# Patient Record
Sex: FEMALE | Race: WHITE | NOT HISPANIC OR LATINO
[De-identification: names, ages, dates, MRNs, and addresses within clinical notes are randomized per-mention and may not be internally consistent; named-entity substitution may affect disease eponyms.]

---

## 2022-08-15 ENCOUNTER — TELEPHONE (OUTPATIENT)
Dept: SCHEDULING | Age: 27
End: 2022-08-15

## 2022-08-15 ENCOUNTER — APPOINTMENT (OUTPATIENT)
Dept: URGENT CARE | Age: 27
End: 2022-08-15

## 2022-08-15 ENCOUNTER — OFFICE VISIT (OUTPATIENT)
Dept: URGENT CARE | Age: 27
End: 2022-08-15

## 2022-08-15 DIAGNOSIS — Z30.42 DEPO-PROVERA CONTRACEPTIVE STATUS: Primary | ICD-10-CM

## 2022-08-15 PROCEDURE — 96372 THER/PROPH/DIAG INJ SC/IM: CPT | Performed by: NURSE PRACTITIONER

## 2022-08-15 RX ORDER — MEDROXYPROGESTERONE ACETATE 150 MG/ML
150 INJECTION, SUSPENSION INTRAMUSCULAR ONCE
Status: COMPLETED | OUTPATIENT
Start: 2022-08-15 | End: 2022-08-15

## 2022-08-15 RX ADMIN — MEDROXYPROGESTERONE ACETATE 150 MG: 150 INJECTION, SUSPENSION INTRAMUSCULAR at 12:45

## 2022-11-07 ENCOUNTER — NURSE ONLY (OUTPATIENT)
Dept: URGENT CARE | Age: 27
End: 2022-11-07

## 2022-11-07 VITALS
HEART RATE: 94 BPM | SYSTOLIC BLOOD PRESSURE: 116 MMHG | RESPIRATION RATE: 18 BRPM | HEIGHT: 64 IN | WEIGHT: 164 LBS | TEMPERATURE: 98.1 F | OXYGEN SATURATION: 98 % | BODY MASS INDEX: 28 KG/M2 | DIASTOLIC BLOOD PRESSURE: 78 MMHG

## 2022-11-07 DIAGNOSIS — Z30.42 DEPO-PROVERA CONTRACEPTIVE STATUS: Primary | ICD-10-CM

## 2022-11-07 PROCEDURE — 96372 THER/PROPH/DIAG INJ SC/IM: CPT | Performed by: NURSE PRACTITIONER

## 2022-11-07 RX ORDER — MEDROXYPROGESTERONE ACETATE 150 MG/ML
150 INJECTION, SUSPENSION INTRAMUSCULAR ONCE
Status: COMPLETED | OUTPATIENT
Start: 2022-11-07 | End: 2022-11-07

## 2022-11-07 RX ADMIN — MEDROXYPROGESTERONE ACETATE 150 MG: 150 INJECTION, SUSPENSION INTRAMUSCULAR at 09:45

## 2022-11-15 ENCOUNTER — HOSPITAL ENCOUNTER (EMERGENCY)
Facility: HOSPITAL | Age: 27
Discharge: HOME | End: 2022-11-16
Attending: EMERGENCY MEDICINE
Payer: MEDICAID

## 2022-11-15 VITALS
BODY MASS INDEX: 27.66 KG/M2 | RESPIRATION RATE: 18 BRPM | DIASTOLIC BLOOD PRESSURE: 78 MMHG | HEIGHT: 64 IN | SYSTOLIC BLOOD PRESSURE: 123 MMHG | WEIGHT: 162 LBS | HEART RATE: 98 BPM | OXYGEN SATURATION: 96 % | TEMPERATURE: 98 F

## 2022-11-15 DIAGNOSIS — G57.11 MERALGIA PARAESTHETICA, RIGHT: Primary | ICD-10-CM

## 2022-11-15 PROCEDURE — 99283 EMERGENCY DEPT VISIT LOW MDM: CPT

## 2022-11-15 NOTE — ED INITIAL ASSESSMENT (HPI)
Pain. Tingling , burning  In right thigh since 2 months increased since few days , no swelling . Seen her primary 1 week ago  Took medications but  No improvement.

## 2022-11-16 ENCOUNTER — APPOINTMENT (OUTPATIENT)
Dept: GENERAL RADIOLOGY | Facility: HOSPITAL | Age: 27
End: 2022-11-16
Attending: EMERGENCY MEDICINE
Payer: MEDICAID

## 2022-11-16 LAB — B-HCG UR QL: NEGATIVE

## 2022-11-16 PROCEDURE — 72110 X-RAY EXAM L-2 SPINE 4/>VWS: CPT | Performed by: EMERGENCY MEDICINE

## 2022-11-16 PROCEDURE — 81025 URINE PREGNANCY TEST: CPT

## 2022-11-30 ENCOUNTER — HOSPITAL ENCOUNTER (EMERGENCY)
Facility: HOSPITAL | Age: 27
Discharge: HOME | End: 2022-11-30
Attending: EMERGENCY MEDICINE
Payer: MEDICAID

## 2022-11-30 VITALS
HEIGHT: 64 IN | TEMPERATURE: 100 F | BODY MASS INDEX: 27.31 KG/M2 | WEIGHT: 160 LBS | RESPIRATION RATE: 16 BRPM | DIASTOLIC BLOOD PRESSURE: 82 MMHG | HEART RATE: 105 BPM | OXYGEN SATURATION: 99 % | SYSTOLIC BLOOD PRESSURE: 122 MMHG

## 2022-11-30 DIAGNOSIS — J10.1 INFLUENZA A: Primary | ICD-10-CM

## 2022-11-30 DIAGNOSIS — U07.1 COVID-19: ICD-10-CM

## 2022-11-30 LAB
BILIRUB UR QL STRIP.AUTO: NEGATIVE
COLOR UR AUTO: YELLOW
FLUAV + FLUBV RNA SPEC NAA+PROBE: NEGATIVE
FLUAV + FLUBV RNA SPEC NAA+PROBE: POSITIVE
GLUCOSE UR STRIP.AUTO-MCNC: NEGATIVE MG/DL
KETONES UR STRIP.AUTO-MCNC: NEGATIVE MG/DL
LEUKOCYTE ESTERASE UR QL STRIP.AUTO: NEGATIVE
NITRITE UR QL STRIP.AUTO: NEGATIVE
PH UR STRIP.AUTO: 6.5 [PH] (ref 5–8)
RSV RNA SPEC NAA+PROBE: NEGATIVE
SARS-COV-2 RNA RESP QL NAA+PROBE: DETECTED
SP GR UR STRIP.AUTO: 1.02 (ref 1–1.03)
UROBILINOGEN UR STRIP.AUTO-MCNC: 0.2 MG/DL

## 2022-11-30 PROCEDURE — 0241U SARS-COV-2/FLU A AND B/RSV BY PCR (GENEXPERT): CPT | Performed by: EMERGENCY MEDICINE

## 2022-11-30 PROCEDURE — 81001 URINALYSIS AUTO W/SCOPE: CPT | Performed by: EMERGENCY MEDICINE

## 2022-11-30 PROCEDURE — 81015 MICROSCOPIC EXAM OF URINE: CPT | Performed by: EMERGENCY MEDICINE

## 2022-11-30 PROCEDURE — 99283 EMERGENCY DEPT VISIT LOW MDM: CPT | Performed by: EMERGENCY MEDICINE

## 2022-11-30 RX ORDER — ACETAMINOPHEN 500 MG
1000 TABLET ORAL ONCE
Status: COMPLETED | OUTPATIENT
Start: 2022-11-30 | End: 2022-11-30

## 2022-11-30 RX ORDER — DEXMETHYLPHENIDATE HYDROCHLORIDE 10 MG/1
10 TABLET ORAL DAILY
COMMUNITY

## 2022-11-30 RX ORDER — FLUOXETINE HYDROCHLORIDE 60 MG/1
TABLET, FILM COATED ORAL; ORAL DAILY
COMMUNITY

## 2022-11-30 RX ORDER — RISPERIDONE 1 MG/1
1 TABLET ORAL 2 TIMES DAILY
COMMUNITY

## 2023-02-07 ENCOUNTER — APPOINTMENT (OUTPATIENT)
Dept: INTERNAL MEDICINE | Age: 28
End: 2023-02-07

## 2023-02-07 ENCOUNTER — NURSE ONLY (OUTPATIENT)
Dept: URGENT CARE | Age: 28
End: 2023-02-07

## 2023-02-07 VITALS — TEMPERATURE: 98 F

## 2023-02-07 DIAGNOSIS — Z30.42 DEPO-PROVERA CONTRACEPTIVE STATUS: Primary | ICD-10-CM

## 2023-02-07 PROCEDURE — 96372 THER/PROPH/DIAG INJ SC/IM: CPT | Performed by: REGISTERED NURSE

## 2023-02-07 RX ORDER — MEDROXYPROGESTERONE ACETATE 150 MG/ML
150 INJECTION, SUSPENSION INTRAMUSCULAR ONCE
Status: DISCONTINUED | OUTPATIENT
Start: 2023-02-07 | End: 2023-02-07

## 2023-05-01 ENCOUNTER — APPOINTMENT (OUTPATIENT)
Dept: URGENT CARE | Age: 28
End: 2023-05-01

## 2023-05-02 ENCOUNTER — OFFICE VISIT (OUTPATIENT)
Dept: URGENT CARE | Age: 28
End: 2023-05-02

## 2023-05-02 DIAGNOSIS — Z30.42 DEPO-PROVERA CONTRACEPTIVE STATUS: Primary | ICD-10-CM

## 2023-05-02 PROCEDURE — 96372 THER/PROPH/DIAG INJ SC/IM: CPT | Performed by: NURSE PRACTITIONER

## 2023-05-02 RX ORDER — MEDROXYPROGESTERONE ACETATE 150 MG/ML
150 INJECTION, SUSPENSION INTRAMUSCULAR ONCE
Status: COMPLETED | OUTPATIENT
Start: 2023-05-02 | End: 2023-05-02

## 2023-05-02 RX ADMIN — MEDROXYPROGESTERONE ACETATE 150 MG: 150 INJECTION, SUSPENSION INTRAMUSCULAR at 11:15

## 2023-07-28 ENCOUNTER — OFFICE VISIT (OUTPATIENT)
Dept: URGENT CARE | Age: 28
End: 2023-07-28

## 2023-07-28 DIAGNOSIS — Z30.42 ENCOUNTER FOR DEPO-PROVERA CONTRACEPTION: Primary | ICD-10-CM

## 2023-07-28 PROCEDURE — 96372 THER/PROPH/DIAG INJ SC/IM: CPT | Performed by: NURSE PRACTITIONER

## 2023-07-28 RX ORDER — MEDROXYPROGESTERONE ACETATE 150 MG/ML
150 INJECTION, SUSPENSION INTRAMUSCULAR ONCE
Status: COMPLETED | OUTPATIENT
Start: 2023-07-28 | End: 2023-07-28

## 2023-07-28 RX ADMIN — MEDROXYPROGESTERONE ACETATE 150 MG: 150 INJECTION, SUSPENSION INTRAMUSCULAR at 15:39

## 2023-08-20 ENCOUNTER — HOSPITAL ENCOUNTER (EMERGENCY)
Facility: HOSPITAL | Age: 28
Discharge: HOME | End: 2023-08-20
Attending: EMERGENCY MEDICINE
Payer: MEDICAID

## 2023-08-20 ENCOUNTER — APPOINTMENT (OUTPATIENT)
Dept: GENERAL RADIOLOGY | Facility: HOSPITAL | Age: 28
End: 2023-08-20
Attending: EMERGENCY MEDICINE
Payer: MEDICAID

## 2023-08-20 ENCOUNTER — APPOINTMENT (OUTPATIENT)
Dept: GENERAL RADIOLOGY | Facility: HOSPITAL | Age: 28
End: 2023-08-20
Payer: MEDICAID

## 2023-08-20 VITALS
DIASTOLIC BLOOD PRESSURE: 89 MMHG | WEIGHT: 140 LBS | TEMPERATURE: 98 F | HEART RATE: 102 BPM | HEIGHT: 64.57 IN | SYSTOLIC BLOOD PRESSURE: 127 MMHG | OXYGEN SATURATION: 98 % | RESPIRATION RATE: 18 BRPM | BODY MASS INDEX: 23.61 KG/M2

## 2023-08-20 DIAGNOSIS — S93.401A MODERATE RIGHT ANKLE SPRAIN, INITIAL ENCOUNTER: Primary | ICD-10-CM

## 2023-08-20 PROCEDURE — 99284 EMERGENCY DEPT VISIT MOD MDM: CPT

## 2023-08-20 PROCEDURE — 73630 X-RAY EXAM OF FOOT: CPT | Performed by: EMERGENCY MEDICINE

## 2023-08-20 PROCEDURE — 73610 X-RAY EXAM OF ANKLE: CPT

## 2023-08-20 PROCEDURE — 99283 EMERGENCY DEPT VISIT LOW MDM: CPT

## 2023-08-20 NOTE — ED INITIAL ASSESSMENT (HPI)
Pt states that she was at [employer] yesterday and was running down a hill and tripped and fell. Pt c/o of pain to her right ankle. Pt denies head injury or LOC. Pt took 800 mg ibuprofen PTA.

## 2023-09-20 ENCOUNTER — HOSPITAL ENCOUNTER (EMERGENCY)
Facility: HOSPITAL | Age: 28
Discharge: HOME | End: 2023-09-20
Attending: EMERGENCY MEDICINE
Payer: OTHER MISCELLANEOUS

## 2023-09-20 VITALS
DIASTOLIC BLOOD PRESSURE: 96 MMHG | OXYGEN SATURATION: 100 % | RESPIRATION RATE: 18 BRPM | TEMPERATURE: 99 F | HEART RATE: 96 BPM | SYSTOLIC BLOOD PRESSURE: 138 MMHG

## 2023-09-20 DIAGNOSIS — S39.012A STRAIN OF LUMBAR REGION, INITIAL ENCOUNTER: Primary | ICD-10-CM

## 2023-09-20 PROCEDURE — 99283 EMERGENCY DEPT VISIT LOW MDM: CPT

## 2023-09-20 PROCEDURE — 99284 EMERGENCY DEPT VISIT MOD MDM: CPT

## 2023-09-20 RX ORDER — CYCLOBENZAPRINE HCL 10 MG
10 TABLET ORAL 3 TIMES DAILY PRN
Qty: 20 TABLET | Refills: 0 | Status: SHIPPED | OUTPATIENT
Start: 2023-09-20 | End: 2023-09-27

## 2023-09-20 NOTE — DISCHARGE INSTRUCTIONS
Use naproxen Aleve ibuprofen or Tylenol for milder pain. Apply ice 20 minutes 4 times a day. You can use over-the-counter Salonpas patches. For more severe pain you can take Flexeril this may make you drowsy do not drink or drive while taking it. Bedrest with gradual increase in activity over the next couple of days. Follow-up with occupational medicine in 2 days.

## 2023-12-27 PROCEDURE — 99284 EMERGENCY DEPT VISIT MOD MDM: CPT

## 2023-12-28 ENCOUNTER — HOSPITAL ENCOUNTER (EMERGENCY)
Facility: HOSPITAL | Age: 28
Discharge: HOME | End: 2023-12-28
Attending: EMERGENCY MEDICINE
Payer: MEDICAID

## 2023-12-28 VITALS
SYSTOLIC BLOOD PRESSURE: 119 MMHG | HEIGHT: 64 IN | OXYGEN SATURATION: 96 % | HEART RATE: 95 BPM | BODY MASS INDEX: 25.95 KG/M2 | TEMPERATURE: 99 F | WEIGHT: 152 LBS | RESPIRATION RATE: 18 BRPM | DIASTOLIC BLOOD PRESSURE: 75 MMHG

## 2023-12-28 DIAGNOSIS — J40 BRONCHITIS: Primary | ICD-10-CM

## 2023-12-28 LAB
FLUAV + FLUBV RNA SPEC NAA+PROBE: NEGATIVE
FLUAV + FLUBV RNA SPEC NAA+PROBE: NEGATIVE
RSV RNA SPEC NAA+PROBE: NEGATIVE
SARS-COV-2 RNA RESP QL NAA+PROBE: NOT DETECTED

## 2023-12-28 PROCEDURE — 0241U SARS-COV-2/FLU A AND B/RSV BY PCR (GENEXPERT): CPT | Performed by: EMERGENCY MEDICINE

## 2023-12-28 RX ORDER — PREDNISONE 20 MG/1
20 TABLET ORAL DAILY
Qty: 5 TABLET | Refills: 0 | Status: SHIPPED | OUTPATIENT
Start: 2023-12-28 | End: 2024-01-02

## 2023-12-28 RX ORDER — CETIRIZINE HYDROCHLORIDE 10 MG/1
10 TABLET ORAL DAILY
COMMUNITY

## 2024-11-06 ENCOUNTER — HOSPITAL ENCOUNTER (OUTPATIENT)
Age: 29
Discharge: HOME | End: 2024-11-06
Payer: OTHER MISCELLANEOUS

## 2024-11-06 ENCOUNTER — APPOINTMENT (OUTPATIENT)
Dept: GENERAL RADIOLOGY | Age: 29
End: 2024-11-06
Attending: PHYSICIAN ASSISTANT
Payer: OTHER MISCELLANEOUS

## 2024-11-06 VITALS
DIASTOLIC BLOOD PRESSURE: 52 MMHG | SYSTOLIC BLOOD PRESSURE: 109 MMHG | HEART RATE: 77 BPM | OXYGEN SATURATION: 99 % | TEMPERATURE: 100 F | RESPIRATION RATE: 22 BRPM | WEIGHT: 160 LBS | HEIGHT: 64 IN | BODY MASS INDEX: 27.31 KG/M2

## 2024-11-06 DIAGNOSIS — S49.91XA INJURY OF RIGHT SHOULDER, INITIAL ENCOUNTER: Primary | ICD-10-CM

## 2024-11-06 PROCEDURE — 73030 X-RAY EXAM OF SHOULDER: CPT | Performed by: PHYSICIAN ASSISTANT

## 2024-11-06 PROCEDURE — 99203 OFFICE O/P NEW LOW 30 MIN: CPT | Performed by: PHYSICIAN ASSISTANT

## 2024-11-06 NOTE — DISCHARGE INSTRUCTIONS
Continue follow-up with Occupational medicine.    You may take over-the-counter ibuprofen and/or Tylenol as needed for pain.

## 2024-11-06 NOTE — ED INITIAL ASSESSMENT (HPI)
Hx of torn right rotator cuff.  Sudden onset of pain while lifting a 50 lb box of lemonade off high shelf.

## 2024-11-07 NOTE — ED PROVIDER NOTES
Patient Seen in: [facility]      History     Chief Complaint   Patient presents with    Arm or Hand Injury     Stated Complaint: Rt shoulder injury    Subjective:   HPI    28 YO female, reported hx of torn right rotator cuff, presents to immediate care for evaluation of right shoulder pain starting today at work after lifting a 50 pound jug of liquid. She denies any other pain sites at this time.           Objective:     Past Medical History:    ADHD    Anxiety    Asperger syndrome (HCC)              History reviewed. No pertinent surgical history.             Social History     Socioeconomic History    Marital status:    Tobacco Use    Smoking status: Never    Smokeless tobacco: Never   Vaping Use    Vaping status: Never Used   Substance and Sexual Activity    Alcohol use: Never    Drug use: Never     Social Drivers of Health     Financial Resource Strain: Not on File (10/7/2022)    Received from [facility]    Financial Resource Strain     Financial Resource Strain: 0   Food Insecurity: Not on File (2024)    Received from [employer]    Food Insecurity     Food: 0   Transportation Needs: Not on File (10/7/2022)    Received from [facility]    Transportation Needs     Transportation: 0   Physical Activity: Not on File (10/7/2022)    Received from [facility]    Physical Activity     Physical Activity: 0   Stress: Not on File (10/7/2022)    Received from [facility]    Stress     Stress: 0   Social Connections: Not on File (2024)    Received from [employer]    Social Connections     Connectedness: 0   Housing Stability: Not on File (10/7/2022)    Received from [facility]    Housing Stability     Housin              Review of Systems    Positive for stated complaint: Rt shoulder injury  Other systems are as noted in HPI.  Constitutional and vital signs reviewed.      All other systems reviewed and negative except as noted above.    Physical Exam     ED Triage Vitals [24  1654]   /52   Pulse 77   Resp 22   Temp 99.5 °F (37.5 °C)   Temp src Temporal   SpO2 99 %   O2 Device None (Room air)       Current Vitals:   Vital Signs  BP: 109/52  Pulse: 77  Resp: 22  Temp: 99.5 °F (37.5 °C)  Temp src: Temporal    Oxygen Therapy  SpO2: 99 %  O2 Device: None (Room air)        Physical Exam  Vitals and nursing note reviewed.   Constitutional:       General: She is not in acute distress.     Appearance: Normal appearance. She is not ill-appearing, toxic-appearing or diaphoretic.   Cardiovascular:      Rate and Rhythm: Normal rate.   Pulmonary:      Effort: Pulmonary effort is normal. No respiratory distress.   Musculoskeletal:      Right shoulder: Tenderness (Generalized tenderness with palpation to the shoulder) present. No swelling. Decreased range of motion (Decreased abduction secondary to generalized tenderness).   Neurological:      Mental Status: She is alert and oriented to person, place, and time.   Psychiatric:         Behavior: Behavior normal.         ED Course   Labs Reviewed - No data to display  XR SHOULDER, COMPLETE (MIN 2 VIEWS), RIGHT (CPT=73030)    Result Date: 11/6/2024  PROCEDURE:  XR SHOULDER, COMPLETE (MIN 2 VIEWS), RIGHT (CPT=73030)  TECHNIQUE:  Multiple views were obtained.  COMPARISON:  None.  INDICATIONS:  Rt shoulder injury  PATIENT STATED HISTORY: (As transcribed by Technologist)  Patient in 2018 tore her right rotator cuff from using the  for too long. Patient today lifted 50 pounds over her head and now has pain in her right shoulder. Patient has limited range of motion.    FINDINGS:  No fracture, dislocation or osseous abnormality.            CONCLUSION:  No abnormality demonstrated in the right shoulder.     LOCATION:  [facility]   Dictated by (CST): Mike Munson MD on 11/06/2024 at 5:47 PM     Finalized by (CST): Mike Munson MD on 11/06/2024 at 5:47 PM           MDM      Differential diagnosis considered but not limited to sprain/strain, rotator  cuff tear, less likely acute osseous process    Physical exam as above. Right shoulder x-ray is negative for fracture or dislocation.  Patient to continue follow-up with occupational medicine this week. Ibuprofen and/or Tylenol as needed for pain.       Medical Decision Making  Amount and/or Complexity of Data Reviewed  Radiology: ordered and independent interpretation performed. Decision-making details documented in ED Course.    Risk  OTC drugs.        Disposition and Plan     Clinical Impression:  1. Injury of right shoulder, initial encounter         Disposition:  Discharge  11/6/2024  5:56 pm    Follow-up:  Chandan Ramírez MD  [address] [place] [address]  [phone]    Schedule an appointment as soon as possible for a visit             Medications Prescribed:  Discharge Medication List as of 11/6/2024  5:57 PM              Supplementary Documentation:

## 2024-12-09 ENCOUNTER — APPOINTMENT (OUTPATIENT)
Dept: GENERAL RADIOLOGY | Age: 29
End: 2024-12-09
Attending: PHYSICIAN ASSISTANT
Payer: COMMERCIAL

## 2024-12-09 ENCOUNTER — HOSPITAL ENCOUNTER (OUTPATIENT)
Age: 29
Discharge: HOME | End: 2024-12-09
Payer: COMMERCIAL

## 2024-12-09 VITALS
HEART RATE: 98 BPM | HEIGHT: 65 IN | DIASTOLIC BLOOD PRESSURE: 86 MMHG | RESPIRATION RATE: 18 BRPM | BODY MASS INDEX: 26.66 KG/M2 | WEIGHT: 160 LBS | TEMPERATURE: 99 F | OXYGEN SATURATION: 98 % | SYSTOLIC BLOOD PRESSURE: 132 MMHG

## 2024-12-09 DIAGNOSIS — R05.1 ACUTE COUGH: ICD-10-CM

## 2024-12-09 DIAGNOSIS — J22 LOWER RESPIRATORY TRACT INFECTION: Primary | ICD-10-CM

## 2024-12-09 LAB — SARS-COV-2 RNA RESP QL NAA+PROBE: NOT DETECTED

## 2024-12-09 PROCEDURE — 71046 X-RAY EXAM CHEST 2 VIEWS: CPT | Performed by: PHYSICIAN ASSISTANT

## 2024-12-09 PROCEDURE — 99204 OFFICE O/P NEW MOD 45 MIN: CPT | Performed by: PHYSICIAN ASSISTANT

## 2024-12-09 PROCEDURE — U0002 COVID-19 LAB TEST NON-CDC: HCPCS | Performed by: PHYSICIAN ASSISTANT

## 2024-12-09 RX ORDER — AZITHROMYCIN 250 MG/1
TABLET, FILM COATED ORAL
Qty: 6 TABLET | Refills: 0 | Status: SHIPPED | OUTPATIENT
Start: 2024-12-09 | End: 2024-12-14

## 2024-12-09 RX ORDER — BENZONATATE 100 MG/1
100 CAPSULE ORAL 3 TIMES DAILY PRN
Qty: 30 CAPSULE | Refills: 0 | Status: SHIPPED | OUTPATIENT
Start: 2024-12-09 | End: 2025-01-08

## 2024-12-10 NOTE — DISCHARGE INSTRUCTIONS
Please follow up with your PCP if no improvement within 5-7 days. Go directly to the ER for any acute worsening of symptoms.    If you smoke, stop smoking.  Push oral fluids to help loosen up chest mucous and move it out of your body.  Use a cold mist humidifier.  Get enough rest and sleep.  Take Guaifenesin (Robitussin), an expectorant to loosen mucous.  To suppress a dry, hacking cough, you may take Dextromethorphan (Robitussin DM)    If antibiotics is prescribed, be sure to take all of the the medication even if you improve before finishing the medication.  Seek immediate medical attention if symptoms worsen, if fever higher than 102, if no improvement in a few days.  Symptoms usually last 10 days.

## 2024-12-10 NOTE — ED INITIAL ASSESSMENT (HPI)
Pt began 5-6 days ago with laryngitis, and a sore throat.  Her voice came back and then started with a bad cough.  She now has a lot of sinus and chest congestion

## 2024-12-10 NOTE — ED PROVIDER NOTES
Patient Seen in: [facility]      History     Chief Complaint   Patient presents with    Cough/URI     Stated Complaint: Cough; headache    Subjective:   HPI      29-year-old female with known history of Asperger syndrome anxiety and ADHD who comes in today complaining of persistent and worsening cough and congestion.  Patient states that the cough is becoming more harsh and worsening.  Patient denies any other symptoms at this time.  Patient denies fever and chills.    Objective:     Past Medical History:    ADHD    Anxiety    Asperger syndrome (HCC)              History reviewed. No pertinent surgical history.             Social History     Socioeconomic History    Marital status:    Tobacco Use    Smoking status: Never    Smokeless tobacco: Never   Vaping Use    Vaping status: Some Days   Substance and Sexual Activity    Alcohol use: Never    Drug use: Never     Social Drivers of Health     Financial Resource Strain: Not on File (10/7/2022)    Received from [facility]    Financial Resource Strain     Financial Resource Strain: 0   Food Insecurity: Not on File (2024)    Received from [employer]    Food Insecurity     Food: 0   Transportation Needs: Not on File (10/7/2022)    Received from [facility]    Transportation Needs     Transportation: 0   Physical Activity: Not on File (10/7/2022)    Received from [facility]    Physical Activity     Physical Activity: 0   Stress: Not on File (10/7/2022)    Received from [facility]    Stress     Stress: 0   Social Connections: Not on File (2024)    Received from [facility] [employer] [facility]    Social Connections     Connectedness: 0   Housing Stability: Not on File (10/7/2022)    Received from [facility]    Housing Stability     Housin              Review of Systems    Positive for stated complaint: Cough; headache  Other systems are as noted in HPI.  Constitutional and vital signs reviewed.      All other systems reviewed and negative except as  noted above.    Physical Exam     ED Triage Vitals [12/09/24 1859]   /86   Pulse 98   Resp 18   Temp 98.5 °F (36.9 °C)   Temp src Oral   SpO2 98 %   O2 Device None (Room air)       Current Vitals:   Vital Signs  BP: 132/86  Pulse: 98  Resp: 18  Temp: 98.5 °F (36.9 °C)  Temp src: Oral    Oxygen Therapy  SpO2: 98 %  O2 Device: None (Room air)        Physical Exam  General Appearance: Alert, cooperative, no distress, appropriate for age   Head: Normocephalic, without obvious abnormality   Eyes: PERRL,  conjunctiva and cornea clear, both eyes   Ears: TM pearly gray color and semitransparent, external ear canals normal, both ears   Nose: Nares symmetrical, septum midline, mucosa normal, clear watery discharge; no sinus tenderness   Throat: Lips, tongue, and mucosa are moist, pink, and intact; teeth intact. No erythema, no exudates or tonsillar hypertrophy, uvula midline, no trismus or drooling no phonation changes, patient handling secretions well   Neck: Supple; no anterior or posterior cervical adenopathy, no neck rigidity or meningeal signs  Lungs: Clear to auscultation bilaterally, respirations unlabored. No wheezing, rales or rhonchi.   Heart: NSR, S1, S2 present. No murmurs, rubs or gallops.  Skin: no rash       ED Course     Labs Reviewed   RAPID SARS-COV-2 BY PCR - Normal   No results found.  Preliminary read of the chest x-ray is negative       MDM          Medical Decision Making  29-year-old female with over 1 week of harsh productive cough.  Patient denies any other symptoms.  Patient denies fever and chills.    Problems Addressed:  Lower respiratory tract infection: acute illness or injury    Amount and/or Complexity of Data Reviewed  Radiology: ordered and independent interpretation performed. Decision-making details documented in ED Course.     Details: Personally viewed the patient's chest x-ray no evidence of acute consolidation or pleural effusion    Risk  OTC drugs.  Prescription drug  management.  Risk Details: Clinical Impression: Bronchitis       The differential diagnosis before testing included Pneumonia, COVID-19, Bronchitis , which is a medical condition that poses a threat to life/function.            Disposition and Plan     Clinical Impression:  1. Lower respiratory tract infection    2. Acute cough         Disposition:  Discharge  12/9/2024  8:15 pm    Follow-up:  Julia Miller MD  [address] [facility] [address]  [phone]    Schedule an appointment as soon as possible for a visit   If symptoms worsen          Medications Prescribed:  Current Discharge Medication List        START taking these medications    Details   azithromycin (ZITHROMAX Z-BELTRAN) 250 MG Oral Tab 500 mg once followed by 250 mg daily x 4 days  Qty: 6 tablet, Refills: 0      benzonatate 100 MG Oral Cap Take 1 capsule (100 mg total) by mouth 3 (three) times daily as needed for cough.  Qty: 30 capsule, Refills: 0                 Supplementary Documentation:

## 2025-04-25 ENCOUNTER — APPOINTMENT (OUTPATIENT)
Dept: GENERAL RADIOLOGY | Facility: HOSPITAL | Age: 30
End: 2025-04-25
Attending: EMERGENCY MEDICINE
Payer: COMMERCIAL

## 2025-04-25 ENCOUNTER — HOSPITAL ENCOUNTER (EMERGENCY)
Facility: HOSPITAL | Age: 30
Discharge: HOME | End: 2025-04-25
Attending: EMERGENCY MEDICINE
Payer: COMMERCIAL

## 2025-04-25 VITALS
HEIGHT: 64 IN | WEIGHT: 165 LBS | OXYGEN SATURATION: 100 % | RESPIRATION RATE: 20 BRPM | HEART RATE: 70 BPM | DIASTOLIC BLOOD PRESSURE: 82 MMHG | TEMPERATURE: 98 F | SYSTOLIC BLOOD PRESSURE: 111 MMHG | BODY MASS INDEX: 28.17 KG/M2

## 2025-04-25 DIAGNOSIS — R07.89 CHEST PAIN, ATYPICAL: Primary | ICD-10-CM

## 2025-04-25 LAB
ALBUMIN SERPL-MCNC: 4.9 G/DL (ref 3.2–4.8)
ALBUMIN/GLOB SERPL: 1.8 {RATIO} (ref 1–2)
ALP LIVER SERPL-CCNC: 81 U/L (ref 37–98)
ALT SERPL-CCNC: 16 U/L (ref 10–49)
ANION GAP SERPL CALC-SCNC: 9 MMOL/L (ref 0–18)
AST SERPL-CCNC: 18 U/L (ref ?–34)
BASOPHILS # BLD AUTO: 0.04 X10(3) UL (ref 0–0.2)
BASOPHILS NFR BLD AUTO: 0.6 %
BILIRUB SERPL-MCNC: 0.3 MG/DL (ref 0.3–1.2)
BUN BLD-MCNC: 11 MG/DL (ref 9–23)
CALCIUM BLD-MCNC: 9.6 MG/DL (ref 8.7–10.6)
CHLORIDE SERPL-SCNC: 110 MMOL/L (ref 98–112)
CO2 SERPL-SCNC: 24 MMOL/L (ref 21–32)
CREAT BLD-MCNC: 0.9 MG/DL (ref 0.55–1.02)
D DIMER PPP FEU-MCNC: 0.27 UG/ML FEU (ref ?–0.5)
EGFRCR SERPLBLD CKD-EPI 2021: 89 ML/MIN/1.73M2 (ref 60–?)
EOSINOPHIL # BLD AUTO: 0.17 X10(3) UL (ref 0–0.7)
EOSINOPHIL NFR BLD AUTO: 2.4 %
ERYTHROCYTE [DISTWIDTH] IN BLOOD BY AUTOMATED COUNT: 12.1 %
GLOBULIN PLAS-MCNC: 2.8 G/DL (ref 2–3.5)
GLUCOSE BLD-MCNC: 99 MG/DL (ref 70–99)
HCT VFR BLD AUTO: 44.4 % (ref 35–48)
HGB BLD-MCNC: 15.3 G/DL (ref 12–16)
IMM GRANULOCYTES # BLD AUTO: 0.04 X10(3) UL (ref 0–1)
IMM GRANULOCYTES NFR BLD: 0.6 %
LYMPHOCYTES # BLD AUTO: 2.79 X10(3) UL (ref 1–4)
LYMPHOCYTES NFR BLD AUTO: 39.6 %
MCH RBC QN AUTO: 29.1 PG (ref 26–34)
MCHC RBC AUTO-ENTMCNC: 34.5 G/DL (ref 31–37)
MCV RBC AUTO: 84.6 FL (ref 80–100)
MONOCYTES # BLD AUTO: 0.87 X10(3) UL (ref 0.1–1)
MONOCYTES NFR BLD AUTO: 12.3 %
NEUTROPHILS # BLD AUTO: 3.14 X10 (3) UL (ref 1.5–7.7)
NEUTROPHILS # BLD AUTO: 3.14 X10(3) UL (ref 1.5–7.7)
NEUTROPHILS NFR BLD AUTO: 44.5 %
OSMOLALITY SERPL CALC.SUM OF ELEC: 295 MOSM/KG (ref 275–295)
PLATELET # BLD AUTO: 305 10(3)UL (ref 150–450)
POTASSIUM SERPL-SCNC: 4 MMOL/L (ref 3.5–5.1)
PROT SERPL-MCNC: 7.7 G/DL (ref 5.7–8.2)
RBC # BLD AUTO: 5.25 X10(6)UL (ref 3.8–5.3)
SODIUM SERPL-SCNC: 143 MMOL/L (ref 136–145)
TROPONIN I SERPL HS-MCNC: <3 NG/L (ref ?–34)
WBC # BLD AUTO: 7.1 X10(3) UL (ref 4–11)

## 2025-04-25 PROCEDURE — 99284 EMERGENCY DEPT VISIT MOD MDM: CPT

## 2025-04-25 PROCEDURE — 85025 COMPLETE CBC W/AUTO DIFF WBC: CPT | Performed by: EMERGENCY MEDICINE

## 2025-04-25 PROCEDURE — 84484 ASSAY OF TROPONIN QUANT: CPT | Performed by: EMERGENCY MEDICINE

## 2025-04-25 PROCEDURE — 93010 ELECTROCARDIOGRAM REPORT: CPT

## 2025-04-25 PROCEDURE — 96374 THER/PROPH/DIAG INJ IV PUSH: CPT

## 2025-04-25 PROCEDURE — 93005 ELECTROCARDIOGRAM TRACING: CPT

## 2025-04-25 PROCEDURE — 80053 COMPREHEN METABOLIC PANEL: CPT | Performed by: EMERGENCY MEDICINE

## 2025-04-25 PROCEDURE — 85379 FIBRIN DEGRADATION QUANT: CPT | Performed by: EMERGENCY MEDICINE

## 2025-04-25 RX ORDER — FAMOTIDINE 10 MG/ML
20 INJECTION, SOLUTION INTRAVENOUS ONCE
Status: COMPLETED | OUTPATIENT
Start: 2025-04-25 | End: 2025-04-25

## 2025-04-25 RX ORDER — MAGNESIUM HYDROXIDE/ALUMINUM HYDROXICE/SIMETHICONE 120; 1200; 1200 MG/30ML; MG/30ML; MG/30ML
30 SUSPENSION ORAL ONCE
Status: COMPLETED | OUTPATIENT
Start: 2025-04-25 | End: 2025-04-25

## 2025-04-25 RX ORDER — FAMOTIDINE 20 MG/1
20 TABLET, FILM COATED ORAL 2 TIMES DAILY PRN
Qty: 30 TABLET | Refills: 0 | Status: SHIPPED | OUTPATIENT
Start: 2025-04-25 | End: 2025-05-25

## 2025-04-25 NOTE — DISCHARGE INSTRUCTIONS
Please avoid any high acid containing foods like orange juice tomato sauce spicy foods.  Pepcid twice daily

## 2025-04-25 NOTE — ED PROVIDER NOTES
Patient Seen in: [facility]      History     Chief Complaint   Patient presents with    Chest Pain Angina     Stated Complaint: pt states diagnosed with pnuemonia now having chest pain    Subjective:   HPI    30-year-old presents to the emergency department with her significant other she states that she had a \"cold\" that consisted of a runny nose last week.  She was taking [place] cold and flu.  Significant other was ill as well and taking the same.  She states that from an illness standpoint she felt like she had mostly recovered but now is experiencing chest pain off and on worse with taking a breath she states 5 which she would call discrete episodes yesterday she says that it is in her middle chest that she feels like it restricts her breathing that it is quite painful she states that she has never in her life had chest pain of any kind she does feel like it may have been worse when lying flat.  She does get the Depo injections for birth control.  History of Present Illness               Objective:     No pertinent past medical history.            No pertinent past surgical history.              No pertinent social history.                              Physical Exam     ED Triage Vitals   BP 04/25/25 0559 134/87   Pulse 04/25/25 0559 108   Resp 04/25/25 0559 20   Temp 04/25/25 0600 97.9 °F (36.6 °C)   Temp src 04/25/25 0600 Temporal   SpO2 04/25/25 0559 100 %   O2 Device 04/25/25 0559 None (Room air)       Current Vitals:   Vital Signs  BP: 121/85  Pulse: 90  Resp: 18  Temp: 97.9 °F (36.6 °C)  Temp src: Temporal  MAP (mmHg): 96    Oxygen Therapy  SpO2: 100 %  O2 Device: None (Room air)        Physical Exam  She is an awake alert and oriented 30-year-old she has a mask in place as to why she is lying on emergency department bed her  is at bedside her HEENT exam reveals pupils that are equal round reactive light extraocular movements are intact her neck is supple with full  range of motion and no meningeal signs her lungs are clear to auscultation without wheezes rales rhonchi or crackles her heart has a regular rate and rhythm without murmurs rubs or gallops abdomen is soft nontender nondistended  Physical Exam                ED Course     Labs Reviewed   COMP METABOLIC PANEL (14) - Abnormal; Notable for the following components:       Result Value    Albumin 4.9 (*)     All other components within normal limits   TROPONIN I HIGH SENSITIVITY - Normal   CBC WITH DIFFERENTIAL WITH PLATELET   D-DIMER     EKG    Rate, intervals and axes as noted on EKG Report.  Rate: 102  Rhythm: Sinus Rhythm  Readin bpm sinus tachycardia no acute ST elevation or significant ST depression to suggest acute ischemia.  Benign EKG with normal intervals and axis            Lung exam would certainly not suggest pneumonia as lung sounds are clear without crackles.  Discussed with patient that my priority here would be to rule out myocarditis and pericarditis as well as a pulmonary embolus in this presentation.  Patient does use the Depo-Provera rather than estrogen containing birth control so she should not necessarily be at increased risk from a hormonal standpoint.  This may very well be gastroesophageal reflux from the \"medications that she was taking combined with the illness itself or just simply musculoskeletal chest pain.  Discussed this with the patient as well she expressed understanding.  We will begin the appropriate workup at including a D-dimer if that is negative I do not think that we need to further pursue a pulmonary embolus diagnosis the troponin and benign EKG should help with ruling out myocarditis and pericarditis. Results                                 MDM      Very pleasant 30-year-old presents to the emergency department with her significant other with chest pain yesterday that she describes as being in her mid chest and coming and going throughout the day worse with lying flat  this was following upper respiratory infection that she and her significant other close had last week.  She was having chest pain that she states made it hard to catch her breath or breathe with but it was worse when lying flat.        MDM    Disposition and Plan     Clinical Impression:  No diagnosis found.     Disposition:  There is no disposition on file for this visit.  There is no disposition time on file for this visit.    Follow-up:  No follow-up provider specified.        Medications Prescribed:  Current Discharge Medication List          Supplementary Documentation:

## 2025-04-25 NOTE — ED INITIAL ASSESSMENT (HPI)
PT ambulated into the ED c/o of nonradiating middle cp that started Monday. Endorses sob, dx with pneumonia yesterday @ urgent center. Denies N/V. Endorss diarrhea

## 2025-04-26 LAB
ATRIAL RATE: 102 BPM
P AXIS: 57 DEGREES
P-R INTERVAL: 154 MS
Q-T INTERVAL: 344 MS
QRS DURATION: 80 MS
QTC CALCULATION (BEZET): 448 MS
R AXIS: 71 DEGREES
T AXIS: 17 DEGREES
VENTRICULAR RATE: 102 BPM

## 2025-08-29 ENCOUNTER — HOSPITAL ENCOUNTER (OUTPATIENT)
Age: 30
Discharge: HOME | End: 2025-08-29

## 2025-08-29 VITALS
BODY MASS INDEX: 26.46 KG/M2 | WEIGHT: 155 LBS | TEMPERATURE: 99 F | DIASTOLIC BLOOD PRESSURE: 78 MMHG | RESPIRATION RATE: 16 BRPM | HEART RATE: 85 BPM | HEIGHT: 64 IN | SYSTOLIC BLOOD PRESSURE: 115 MMHG | OXYGEN SATURATION: 96 %

## 2025-08-29 DIAGNOSIS — B97.89 ACUTE VIRAL SINUSITIS: Primary | ICD-10-CM

## 2025-08-29 DIAGNOSIS — J01.90 ACUTE VIRAL SINUSITIS: Primary | ICD-10-CM

## 2025-08-29 LAB
POCT INFLUENZA A: NEGATIVE
POCT INFLUENZA B: NEGATIVE
SARS-COV-2 RNA RESP QL NAA+PROBE: NOT DETECTED

## (undated) NOTE — LETTER
Date & Time: 11/30/2022, 6:32 AM  Patient: Jacquelin 85  Encounter Provider(s):    Duglas Mott MD       To Whom It May Concern:    Elder Kothari was seen and treated in our department on 11/30/2022. She should not return to work until Monday, December 5, 2022. Vigorous mask wearing until Saturday, December 11, 2022.     If you have any questions or concerns, please do not hesitate to call.        _____________________________  Physician/APC Signature

## (undated) NOTE — LETTER
September 20, 2023    Patient: Heena Gutierrez   Date of Visit: 9/20/2023       To Whom It May Concern:    Nelson Lopez was seen and treated in our emergency department on 9/20/2023. She should not return to work until September 23 Saturday . If you have any questions or concerns, please don't hesitate to call.        Encounter Provider(s):    Janeen Lamb MD

## (undated) NOTE — LETTER
Date & Time: 8/20/2023, 2:13 PM  Patient: Stefanie Mckay  Encounter Provider(s):    Hardik Hernandez MD       To Whom It May Concern:    Acacia Larson was seen and treated in our department on 8/20/2023. She should not return to work until August 24, 2023 .     If you have any questions or concerns, please do not hesitate to call.        _____________________________  Physician/APC Signature